# Patient Record
Sex: FEMALE | Race: WHITE
[De-identification: names, ages, dates, MRNs, and addresses within clinical notes are randomized per-mention and may not be internally consistent; named-entity substitution may affect disease eponyms.]

---

## 2019-11-11 ENCOUNTER — HOSPITAL ENCOUNTER (EMERGENCY)
Dept: HOSPITAL 72 - EMR | Age: 34
Discharge: HOME | End: 2019-11-11
Payer: SELF-PAY

## 2019-11-11 VITALS — HEIGHT: 62 IN | BODY MASS INDEX: 24.29 KG/M2 | WEIGHT: 132 LBS

## 2019-11-11 VITALS — SYSTOLIC BLOOD PRESSURE: 122 MMHG | DIASTOLIC BLOOD PRESSURE: 72 MMHG

## 2019-11-11 VITALS — SYSTOLIC BLOOD PRESSURE: 107 MMHG | DIASTOLIC BLOOD PRESSURE: 84 MMHG

## 2019-11-11 DIAGNOSIS — N93.9: Primary | ICD-10-CM

## 2019-11-11 DIAGNOSIS — E28.2: ICD-10-CM

## 2019-11-11 LAB
ADD MANUAL DIFF: NO
ALBUMIN SERPL-MCNC: 4.4 G/DL (ref 3.4–5)
ALBUMIN/GLOB SERPL: 1.2 {RATIO} (ref 1–2.7)
ALP SERPL-CCNC: 77 U/L (ref 46–116)
ALT SERPL-CCNC: 21 U/L (ref 12–78)
ANION GAP SERPL CALC-SCNC: 10 MMOL/L (ref 5–15)
APPEARANCE UR: (no result)
APTT BLD: 31 SEC (ref 23–33)
APTT PPP: (no result) S
AST SERPL-CCNC: 19 U/L (ref 15–37)
BASOPHILS NFR BLD AUTO: 0.8 % (ref 0–2)
BILIRUB SERPL-MCNC: 0.7 MG/DL (ref 0.2–1)
BUN SERPL-MCNC: 11 MG/DL (ref 7–18)
CALCIUM SERPL-MCNC: 8.8 MG/DL (ref 8.5–10.1)
CHLORIDE SERPL-SCNC: 103 MMOL/L (ref 98–107)
CO2 SERPL-SCNC: 27 MMOL/L (ref 21–32)
CREAT SERPL-MCNC: 0.6 MG/DL (ref 0.55–1.3)
EOSINOPHIL NFR BLD AUTO: 1.3 % (ref 0–3)
ERYTHROCYTE [DISTWIDTH] IN BLOOD BY AUTOMATED COUNT: 11.9 % (ref 11.6–14.8)
GLOBULIN SER-MCNC: 3.7 G/DL
GLUCOSE UR STRIP-MCNC: NEGATIVE MG/DL
HCT VFR BLD CALC: 39.8 % (ref 37–47)
HGB BLD-MCNC: 13.6 G/DL (ref 12–16)
INR PPP: 1 (ref 0.9–1.1)
KETONES UR QL STRIP: NEGATIVE
LEUKOCYTE ESTERASE UR QL STRIP: (no result)
LYMPHOCYTES NFR BLD AUTO: 30.3 % (ref 20–45)
MCV RBC AUTO: 85 FL (ref 80–99)
MONOCYTES NFR BLD AUTO: 7.1 % (ref 1–10)
NEUTROPHILS NFR BLD AUTO: 60.4 % (ref 45–75)
NITRITE UR QL STRIP: NEGATIVE
PH UR STRIP: 7 [PH] (ref 4.5–8)
PLATELET # BLD: 163 K/UL (ref 150–450)
POTASSIUM SERPL-SCNC: 3.7 MMOL/L (ref 3.5–5.1)
PROT UR QL STRIP: (no result)
RBC # BLD AUTO: 4.71 M/UL (ref 4.2–5.4)
SODIUM SERPL-SCNC: 140 MMOL/L (ref 136–145)
SP GR UR STRIP: 1 (ref 1–1.03)
UROBILINOGEN UR-MCNC: NORMAL MG/DL (ref 0–1)
WBC # BLD AUTO: 4.9 K/UL (ref 4.8–10.8)

## 2019-11-11 PROCEDURE — 85025 COMPLETE CBC W/AUTO DIFF WBC: CPT

## 2019-11-11 PROCEDURE — 81003 URINALYSIS AUTO W/O SCOPE: CPT

## 2019-11-11 PROCEDURE — 86900 BLOOD TYPING SEROLOGIC ABO: CPT

## 2019-11-11 PROCEDURE — 80053 COMPREHEN METABOLIC PANEL: CPT

## 2019-11-11 PROCEDURE — 85610 PROTHROMBIN TIME: CPT

## 2019-11-11 PROCEDURE — 86850 RBC ANTIBODY SCREEN: CPT

## 2019-11-11 PROCEDURE — 86901 BLOOD TYPING SEROLOGIC RH(D): CPT

## 2019-11-11 PROCEDURE — 85730 THROMBOPLASTIN TIME PARTIAL: CPT

## 2019-11-11 PROCEDURE — 76830 TRANSVAGINAL US NON-OB: CPT

## 2019-11-11 PROCEDURE — 99284 EMERGENCY DEPT VISIT MOD MDM: CPT

## 2019-11-11 PROCEDURE — 36415 COLL VENOUS BLD VENIPUNCTURE: CPT

## 2019-11-11 PROCEDURE — 76856 US EXAM PELVIC COMPLETE: CPT

## 2019-11-11 PROCEDURE — 81025 URINE PREGNANCY TEST: CPT

## 2019-11-11 PROCEDURE — 83690 ASSAY OF LIPASE: CPT

## 2019-11-11 PROCEDURE — 96360 HYDRATION IV INFUSION INIT: CPT

## 2019-11-11 NOTE — DIAGNOSTIC IMAGING REPORT
Indication:Lower abdominal and pelvic pain

 

Technique: Grayscale and duplex Doppler imaging of the pelvis performed utilizing a

transabdominal and endovaginal scan.

 

Comparison: None

 

Findings:

 

The size, contour, and configuration of the uterus is within normal limits. The

endometrium is uniformly echogenic and normal in thickness.

 

The ovaries appear normal bilaterally with good dopplerable blood flow. There are

multiple follicles present bilaterally. There is no significant free fluid

identified. Uterus measures 8 x 6.6 x 2.9 cm. Right ovary is 3.4 x 2.1 x 2.9 cm. Left

ovary 3.9 x 2 x 4 cm.

 

IMPRESSION:

 

Negative pelvic ultrasound. No acute findings. Multiple ovarian follicles.

## 2019-11-11 NOTE — NUR
ED Nurse Note:

pt walked in to ED due to having period for last 18 days.  per pt, needs to 
changes panty pads, approx 6-8 per day.  pt also mentioned that passed out 
clots sometimes.  denies any dizziness or light headache.  deliverred baby 
about 16 months ago.  not on breat feeding.  AAO x4.  respirations even and 
non-labored noted.  skin warm to touch.  no open wound noted.  will wait for 
the further order.

## 2019-11-11 NOTE — EMERGENCY ROOM REPORT
History of Present Illness


General


Chief Complaint:  Vaginal


Source:  Patient





Present Illness


HPI


Patient presents with bright red vaginal bleeding.  Her last menstruation was 

supposed to stop 18 days ago however has persisted.  She does not believe she 

is pregnant at this time.  She has a 1-1/2-year-old at home that she was breast-

feeding until 7 months ago.  She has polycystic ovary syndrome.  She never had 

abnormal bleeding like this in the past.  She denies fevers or chills.  She is 

slightly weak when she stands but there is no dizziness or loss of 

consciousness.  Denies dysuria.  No change in bowels.  No vomiting.





No other bleeding issues.


Allergies:  


Coded Allergies:  


     No Known Allergies (Unverified , 11/11/19)





Patient History


Past Medical History:  see triage record


Social History:  Denies: smoking


Social History Narrative


Raising 3 children, 


Last Menstrual Period:  10/24/19


Reviewed Nursing Documentation:  PMH: Agreed; PSxH: Agreed





Nursing Documentation-PMH


Past Medical History:  No Stated History





Review of Systems


Constitutional:  Reports: see HPI


Gastrointestinal:  Denies: abdominal pain


Genitourinary:  Reports: see HPI


Neurological:  Reports: see HPI


Endocrine:  Reports: see HPI


Hematologic/Lymphatic:  Reports: see HPI





Physical Exam





Vital Signs








  Date Time  Temp Pulse Resp B/P (MAP) Pulse Ox O2 Delivery O2 Flow Rate FiO2


 


11/11/19 10:25 99.0 95 18 132/84 (100) 99 Room Air  








Sp02 EP Interpretation:  reviewed, normal


General Appearance:  well appearing, no apparent distress, GCS 15, non-toxic


Head:  normocephalic


Eyes:  bilateral eye normal inspection, bilateral eye PERRL, bilateral eye EOMI


ENT:  moist mucus membranes


Neck:  supple


Respiratory:  lungs clear, normal breath sounds


Cardiovascular #1:  regular rate, rhythm


Cardiovascular #2:  2+ radial (R)


Gastrointestinal:  normal inspection, normal bowel sounds, non tender, no mass, 

non-distended


Genitourinary:  no CVA tenderness, deferred - For ultrasound


Musculoskeletal:  back normal, gait/station normal, normal range of motion


Neurologic:  alert, oriented x3, grossly normal


Psychiatric:  anxious


Skin:  no rash, normal color





Medical Decision Making


Diagnostic Impression:  


 Primary Impression:  


 Abnormal vaginal bleeding


 Additional Impression:  


 Polycystic ovary syndrome


ER Course


Resents with abnormal vaginal bleeding for 18 days.  Differential includes 

fibroid, ectopic pregnancy, threatened miscarriage, abnormal bleeding amongst 

others.  The patient will be evaluated with ultrasound, labs including 

pregnancy test.  The patient will have orthostatic vital signs and receive IV 

hydration.  She does complain about some abdominal pain but declines pain 

medication at this time.  





Not orthostatic.





Labs with normal hemoglobin and hematocrit.  Pregnancy negative.  Urinalysis 

clear.





Call Ob Dr. Trotter [phone] - answering service - performing C section 

and will call.





Discussed with OB.





Discussed findings and treatment plan with patient.





Patient stable for outpatient observation and treatment.





Laboratory Tests








Test


  11/11/19


10:34 11/11/19


10:40


 


Urine Color Red   


 


Urine Appearance


  Slightly


cloudy 


 


 


Urine pH 7 (4.5-8.0)   


 


Urine Specific Gravity


  1.005


(1.005-1.035) 


 


 


Urine Protein


  2+ (NEGATIVE)


H 


 


 


Urine Glucose (UA)


  Negative


(NEGATIVE) 


 


 


Urine Ketones


  Negative


(NEGATIVE) 


 


 


Urine Blood


  5+ (NEGATIVE)


H 


 


 


Urine Nitrite


  Negative


(NEGATIVE) 


 


 


Urine Bilirubin


  Negative


(NEGATIVE) 


 


 


Urine Urobilinogen


  Normal MG/DL


(0.0-1.0) 


 


 


Urine Leukocyte Esterase


  1+ (NEGATIVE)


H 


 


 


Urine RBC


  Tntc /HPF (0 -


2)  H 


 


 


Urine WBC


  0-2 /HPF (0 -


2) 


 


 


Urine Squamous Epithelial


Cells Few /LPF


(NONE/OCC) 


 


 


Urine Bacteria


  Few /HPF


(NONE) 


 


 


Urine HCG, Qualitative


  Negative


(NEGATIVE) 


 


 


White Blood Count


  


  4.9 K/UL


(4.8-10.8)


 


Red Blood Count


  


  4.71 M/UL


(4.20-5.40)


 


Hemoglobin


  


  13.6 G/DL


(12.0-16.0)


 


Hematocrit


  


  39.8 %


(37.0-47.0)


 


Mean Corpuscular Volume  85 FL (80-99)  


 


Mean Corpuscular Hemoglobin


  


  29.0 PG


(27.0-31.0)


 


Mean Corpuscular Hemoglobin


Concent 


  34.2 G/DL


(32.0-36.0)


 


Red Cell Distribution Width


  


  11.9 %


(11.6-14.8)


 


Platelet Count


  


  163 K/UL


(150-450)


 


Mean Platelet Volume


  


  7.9 FL


(6.5-10.1)


 


Neutrophils (%) (Auto)


  


  60.4 %


(45.0-75.0)


 


Lymphocytes (%) (Auto)


  


  30.3 %


(20.0-45.0)


 


Monocytes (%) (Auto)


  


  7.1 %


(1.0-10.0)


 


Eosinophils (%) (Auto)


  


  1.3 %


(0.0-3.0)


 


Basophils (%) (Auto)


  


  0.8 %


(0.0-2.0)


 


Prothrombin Time


  


  10.5 SEC


(9.30-11.50)


 


Prothrombin Time INR  1.0 (0.9-1.1)  


 


PTT


  


  31 SEC (23-33)


 


 


Sodium Level


  


  140 MMOL/L


(136-145)


 


Potassium Level


  


  3.7 MMOL/L


(3.5-5.1)


 


Chloride Level


  


  103 MMOL/L


()


 


Carbon Dioxide Level


  


  27 MMOL/L


(21-32)


 


Anion Gap


  


  10 mmol/L


(5-15)


 


Blood Urea Nitrogen


  


  11 mg/dL


(7-18)


 


Creatinine


  


  0.6 MG/DL


(0.55-1.30)


 


Estimate Glomerular


Filtration Rate 


  > 60 mL/min


(>60)


 


Glucose Level


  


  105 MG/DL


()


 


Calcium Level


  


  8.8 MG/DL


(8.5-10.1)


 


Total Bilirubin


  


  0.7 MG/DL


(0.2-1.0)


 


Aspartate Amino Transferase


(AST) 


  19 U/L (15-37)


 


 


Alanine Aminotransferase (ALT)


  


  21 U/L (12-78)


 


 


Alkaline Phosphatase


  


  77 U/L


()


 


Total Protein


  


  8.1 G/DL


(6.4-8.2)


 


Albumin


  


  4.4 G/DL


(3.4-5.0)


 


Globulin  3.7 g/dL  


 


Albumin/Globulin Ratio  1.2 (1.0-2.7)  


 


Lipase


  


  95 U/L


()








CT/MRI/US Diagnostic Results


CT/MRI/US Diagnostic Results :  


   Imaging Test Ordered:  pelvic ultrasound


   Impression


Negative pelvic ultrasound no acute findings multiple ovarian follicles





Last Vital Signs








  Date Time  Temp Pulse Resp B/P (MAP) Pulse Ox O2 Delivery O2 Flow Rate FiO2


 


11/11/19 14:00 98.7 88 16 122/72 97 Room Air  








Status:  improved


Disposition:  HOME, SELF-CARE


Condition:  Stable


Scripts


Pnv Cmb#21/Iron/Folic Acid (PRENATAL COMPLETE CAPLET) 1 Each Tablet


1 EACH PO DAILY, #30 TAB


   Prov: John Boggs MD         11/11/19











John Boggs MD Nov 11, 2019 10:42